# Patient Record
Sex: FEMALE | Race: ASIAN | NOT HISPANIC OR LATINO | Employment: FULL TIME | URBAN - METROPOLITAN AREA
[De-identification: names, ages, dates, MRNs, and addresses within clinical notes are randomized per-mention and may not be internally consistent; named-entity substitution may affect disease eponyms.]

---

## 2017-06-20 ENCOUNTER — APPOINTMENT (OUTPATIENT)
Dept: WOMENS IMAGING | Facility: HOSPITAL | Age: 42
End: 2017-06-20

## 2017-06-20 PROCEDURE — 77067 SCR MAMMO BI INCL CAD: CPT | Performed by: RADIOLOGY

## 2017-06-20 PROCEDURE — 77063 BREAST TOMOSYNTHESIS BI: CPT | Performed by: RADIOLOGY

## 2018-06-25 ENCOUNTER — APPOINTMENT (OUTPATIENT)
Dept: WOMENS IMAGING | Facility: HOSPITAL | Age: 43
End: 2018-06-25

## 2018-06-25 PROCEDURE — 77063 BREAST TOMOSYNTHESIS BI: CPT | Performed by: RADIOLOGY

## 2018-06-25 PROCEDURE — 77067 SCR MAMMO BI INCL CAD: CPT | Performed by: RADIOLOGY

## 2018-07-23 ENCOUNTER — LAB (OUTPATIENT)
Dept: LAB | Facility: HOSPITAL | Age: 43
End: 2018-07-23

## 2018-07-23 ENCOUNTER — OFFICE VISIT (OUTPATIENT)
Dept: GASTROENTEROLOGY | Facility: CLINIC | Age: 43
End: 2018-07-23

## 2018-07-23 VITALS
DIASTOLIC BLOOD PRESSURE: 94 MMHG | WEIGHT: 124.5 LBS | HEART RATE: 80 BPM | BODY MASS INDEX: 26.13 KG/M2 | SYSTOLIC BLOOD PRESSURE: 142 MMHG | HEIGHT: 58 IN

## 2018-07-23 DIAGNOSIS — K76.0 FATTY LIVER: Primary | ICD-10-CM

## 2018-07-23 DIAGNOSIS — R14.0 BLOATING: ICD-10-CM

## 2018-07-23 DIAGNOSIS — K76.0 FATTY LIVER: ICD-10-CM

## 2018-07-23 DIAGNOSIS — R19.7 DIARRHEA, UNSPECIFIED TYPE: ICD-10-CM

## 2018-07-23 DIAGNOSIS — L29.0 ANAL ITCHING: ICD-10-CM

## 2018-07-23 LAB
ALBUMIN SERPL-MCNC: 4.5 G/DL (ref 3.5–5.2)
ALP SERPL-CCNC: 40 U/L (ref 39–117)
ALT SERPL W P-5'-P-CCNC: 90 U/L (ref 1–33)
AST SERPL-CCNC: 68 U/L (ref 1–32)
BILIRUB CONJ SERPL-MCNC: <0.2 MG/DL (ref 0–0.3)
BILIRUB INDIRECT SERPL-MCNC: ABNORMAL MG/DL
BILIRUB SERPL-MCNC: 0.7 MG/DL (ref 0.1–1.2)
PROT SERPL-MCNC: 7.8 G/DL (ref 6–8.5)

## 2018-07-23 PROCEDURE — 83516 IMMUNOASSAY NONANTIBODY: CPT

## 2018-07-23 PROCEDURE — 84165 PROTEIN E-PHORESIS SERUM: CPT

## 2018-07-23 PROCEDURE — 84155 ASSAY OF PROTEIN SERUM: CPT

## 2018-07-23 PROCEDURE — 99204 OFFICE O/P NEW MOD 45 MIN: CPT | Performed by: INTERNAL MEDICINE

## 2018-07-23 PROCEDURE — 36415 COLL VENOUS BLD VENIPUNCTURE: CPT | Performed by: INTERNAL MEDICINE

## 2018-07-23 PROCEDURE — 80076 HEPATIC FUNCTION PANEL: CPT | Performed by: INTERNAL MEDICINE

## 2018-07-23 RX ORDER — CHOLESTYRAMINE 4 G/9G
4 POWDER, FOR SUSPENSION ORAL DAILY
Qty: 378 G | Refills: 3 | Status: SHIPPED | OUTPATIENT
Start: 2018-07-23 | End: 2018-10-04

## 2018-07-23 RX ORDER — HYDROCORTISONE ACETATE 25 MG
SUPPOSITORY, RECTAL RECTAL
Refills: 0 | COMMUNITY
Start: 2018-06-19 | End: 2019-11-29

## 2018-07-23 NOTE — PATIENT INSTRUCTIONS
Use probiotic of choice (examples include Florastor, Align, [employer], Activia, VSL#3, or any of the common generics).     Vitamin E 400 IU daily.

## 2018-07-23 NOTE — PROGRESS NOTES
Subjective   Dolores Alvarez is a 43 y.o.. female is being seen for consultation today at the request of No ref. provider found    Chief Complaint   Patient presents with   • Elevated Hepatic Enzymes   • Diarrhea   • Bloated   • Gas   • Anal Itching     History of Present Illness  3 issues:    #1 patient was recently noted to have mildly abnormal liver chemistries.  Additional evaluation included hepatic ultrasound which was consistent with steatosis.  Hepatitis serologies were negative.  Metabolic studies were negative but the patient had a positive TOMÁS, significance of which is unknown.  Patient has a small stature and at the weight of 138 pounds had what appeared to be a great deal of truncal obesity.  She's been able lose some weight but she notes that the truncal obesity is no better.  She also had panel demonstrated a HDL in the 60s, and triglycerides in the neighborhood of 140.    #2 chronic diarrhea, get somewhat worse with meals.  Started when she had her gallbladder removed.  Previous colonoscopies 2010 and /2012 negative.  Rectal bleeding.  No unplanned weight loss.    #3 pruritus ani. Recently prescribed Anusol HC suppositories but has not started them yet.    The following portions of the patient's history were reviewed and updated as appropriate: allergies, current medications, past family history, past medical history, past social history, past surgical history and problem list.      Current Outpatient Prescriptions:   •  ANUCORT-HC 25 MG suppository, , Disp: , Rfl: 0  •  Calcium-Vitamin D-Vitamin K (VIACTIV PO), Take  by mouth., Disp: , Rfl:   •  Multiple Vitamins-Minerals (MULTIVITAMIN ADULT PO), Take  by mouth., Disp: , Rfl:   •  Norethin Ace-Eth Estrad-FE (MICROGESTIN 24 FE PO), Take  by mouth., Disp: , Rfl:   •  cholestyramine (QUESTRAN) 4 GM/DOSE powder, Take 1 packet by mouth Daily. mixed with a liquid, Disp: 378 g, Rfl: 3    Family History   Problem Relation Age of Onset   • Adopted: Yes    • Family history unknown: Yes       Review of Systems   Constitutional: Negative for appetite change, diaphoresis, fatigue, fever and unexpected weight change.   HENT: Negative for hearing loss, mouth sores, sore throat and trouble swallowing.    Eyes: Negative for pain and redness.   Respiratory: Negative for choking and shortness of breath.    Cardiovascular: Negative for chest pain and leg swelling.   Gastrointestinal: Positive for abdominal distention and diarrhea. Negative for abdominal pain, anal bleeding, blood in stool, constipation, nausea, rectal pain and vomiting.   Genitourinary: Negative for flank pain and hematuria.   Musculoskeletal: Negative for arthralgias and joint swelling.   Skin: Negative for color change and rash.   Allergic/Immunologic: Negative for food allergies and immunocompromised state.   Neurological: Negative for dizziness, seizures and headaches.   Hematological: Does not bruise/bleed easily.   Psychiatric/Behavioral: Negative for confusion, sleep disturbance and suicidal ideas. The patient is not nervous/anxious.        Objective   Physical Exam   Constitutional: She is oriented to person, place, and time. She appears well-developed and well-nourished.   HENT:   Head: Normocephalic and atraumatic.   Right Ear: External ear normal.   Left Ear: External ear normal.   Nose: Nose normal.   Eyes: Pupils are equal, round, and reactive to light. Conjunctivae are normal.   Neck: Neck supple. No thyromegaly present.   Cardiovascular: Normal heart sounds.  Exam reveals no gallop and no friction rub.    No murmur heard.  Pulmonary/Chest: Effort normal and breath sounds normal.   Abdominal: Soft. Bowel sounds are normal. She exhibits no distension and no mass. There is no tenderness.   Musculoskeletal: She exhibits no edema.   Neurological: She is alert and oriented to person, place, and time.   Skin: No rash noted. No erythema.   Psychiatric: She has a normal mood and affect. Her behavior is  "normal.   Nursing note and vitals reviewed.    Rectal examination was normal.  Hemoccult-negative stool.  Pertinent laboratory results were reviewed. , Pertinent old records were reviewed. , Pertinent radiology results were reviewed.  and A summary of records review may be found in my HPI.     Assessment/Plan   Problems Addressed this Visit        Digestive    Fatty liver - Primary    Relevant Orders    Anti-Smooth Muscle Antibody Titer    Protein Electrophoresis, Total    Hepatic Function Panel    Diarrhea    Relevant Orders    Anti-Smooth Muscle Antibody Titer    Protein Electrophoresis, Total    Hepatic Function Panel       Musculoskeletal and Integument    Anal itching    Relevant Orders    Anti-Smooth Muscle Antibody Titer    Protein Electrophoresis, Total    Hepatic Function Panel       Other    Bloating    Relevant Orders    Anti-Smooth Muscle Antibody Titer    Protein Electrophoresis, Total    Hepatic Function Panel        I suspect the TOMÁS is not relevant to her presentation and that she has steatosis.  I think her truncal obesity is both genetic and diet related.  I would like to go ahead and get additional autoimmune markers and monitor her hepatic functions.  I reviewed with her the book \"[facility] [facility]\" by Dr Nesha Wilburn strongly encouraged her to obtain the book and read especially the first half which discusses dietary measures.  I'm recommending that she try small dose Questran and probiotic therapy.  Vitamin E therapy recommended.  See her back in about 6 weeks.  "

## 2018-07-24 LAB
ACTIN IGG SERPL-ACNC: 10 UNITS (ref 0–19)
ALBUMIN SERPL-MCNC: 3.9 G/DL (ref 2.9–4.4)
ALBUMIN/GLOB SERPL: 1.1 {RATIO} (ref 0.7–1.7)
ALPHA1 GLOB FLD ELPH-MCNC: 0.3 G/DL (ref 0–0.4)
ALPHA2 GLOB SERPL ELPH-MCNC: 0.7 G/DL (ref 0.4–1)
B-GLOBULIN SERPL ELPH-MCNC: 1.3 G/DL (ref 0.7–1.3)
GAMMA GLOB SERPL ELPH-MCNC: 1.3 G/DL (ref 0.4–1.8)
GLOBULIN SER CALC-MCNC: 3.6 G/DL (ref 2.2–3.9)
Lab: NORMAL
M-SPIKE: NORMAL G/DL
PROT SERPL-MCNC: 7.5 G/DL (ref 6–8.5)

## 2018-07-26 ENCOUNTER — TELEPHONE (OUTPATIENT)
Dept: GASTROENTEROLOGY | Facility: CLINIC | Age: 43
End: 2018-07-26

## 2018-08-01 ENCOUNTER — TELEPHONE (OUTPATIENT)
Dept: GASTROENTEROLOGY | Facility: CLINIC | Age: 43
End: 2018-08-01

## 2018-08-01 NOTE — TELEPHONE ENCOUNTER
----- Message from Gaudencio Martinez MD sent at 8/1/2018 12:57 PM EDT -----  Please call the patient regarding her abnormal result. Need to consider low-carb diet.

## 2018-08-01 NOTE — TELEPHONE ENCOUNTER
Pt notified of lab results (hepatic function panel), liver enzymes still elevated.  Dr Martinez recommends high fat-low carb diet as reviewed at last office visit (Whole 30 diet, Joy, or [place]).  Pt has FU appt 9/4/18.

## 2018-08-06 ENCOUNTER — OFFICE (OUTPATIENT)
Dept: URBAN - METROPOLITAN AREA CLINIC 75 | Facility: CLINIC | Age: 43
End: 2018-08-06

## 2018-08-06 VITALS
HEART RATE: 91 BPM | WEIGHT: 131 LBS | HEIGHT: 58 IN | DIASTOLIC BLOOD PRESSURE: 88 MMHG | SYSTOLIC BLOOD PRESSURE: 120 MMHG

## 2018-08-06 DIAGNOSIS — R19.7 DIARRHEA, UNSPECIFIED: ICD-10-CM

## 2018-08-06 DIAGNOSIS — R94.5 ABNORMAL RESULTS OF LIVER FUNCTION STUDIES: ICD-10-CM

## 2018-08-06 PROCEDURE — 99243 OFF/OP CNSLTJ NEW/EST LOW 30: CPT | Performed by: INTERNAL MEDICINE

## 2018-10-04 ENCOUNTER — OFFICE VISIT (OUTPATIENT)
Dept: GASTROENTEROLOGY | Facility: CLINIC | Age: 43
End: 2018-10-04

## 2018-10-04 VITALS
SYSTOLIC BLOOD PRESSURE: 131 MMHG | BODY MASS INDEX: 26.24 KG/M2 | HEIGHT: 58 IN | HEART RATE: 85 BPM | DIASTOLIC BLOOD PRESSURE: 88 MMHG | WEIGHT: 125 LBS

## 2018-10-04 DIAGNOSIS — R14.0 BLOATING: ICD-10-CM

## 2018-10-04 DIAGNOSIS — K76.0 FATTY LIVER: Primary | ICD-10-CM

## 2018-10-04 PROCEDURE — 99213 OFFICE O/P EST LOW 20 MIN: CPT | Performed by: INTERNAL MEDICINE

## 2018-10-04 RX ORDER — DICYCLOMINE HYDROCHLORIDE 10 MG/1
10 CAPSULE ORAL
Qty: 90 CAPSULE | Refills: 2 | Status: SHIPPED | OUTPATIENT
Start: 2018-10-04 | End: 2019-11-29

## 2018-10-04 NOTE — PROGRESS NOTES
Subjective   Dolores Alvarez is a 43 y.o.. female is here today for follow-up.    Chief Complaint   Patient presents with   • Elevated Hepatic Enzymes   • Diarrhea   • Bloated   • Gas     History of Present Illness  Patient carries a diagnosis of fatty liver.  Dietary manipulation was advised, patient readily admits that she likes carbohydrates too much to give them up easily.  She continues to have abdominal bloating and flatulence, typically postprandially.    The following portions of the patient's history were reviewed and updated as appropriate: allergies, current medications, past family history, past medical history, past social history, past surgical history and problem list.      Current Outpatient Prescriptions:   •  ANUCORT-HC 25 MG suppository, , Disp: , Rfl: 0  •  Calcium-Vitamin D-Vitamin K (VIACTIV PO), Take  by mouth., Disp: , Rfl:   •  Multiple Vitamins-Minerals (MULTIVITAMIN ADULT PO), Take  by mouth., Disp: , Rfl:   •  Norethin Ace-Eth Estrad-FE (MICROGESTIN 24 FE PO), Take  by mouth., Disp: , Rfl:   •  dicyclomine (BENTYL) 10 MG capsule, Take 1 capsule by mouth 4 (Four) Times a Day Before Meals & at Bedtime., Disp: 90 capsule, Rfl: 2    Family History   Problem Relation Age of Onset   • Adopted: Yes   • Family history unknown: Yes       Review of Systems   Respiratory: Negative for shortness of breath.    Cardiovascular: Negative for chest pain.   All other systems reviewed and are negative.      Objective   Physical Exam   Constitutional: She appears well-developed and well-nourished.   Nursing note and vitals reviewed.      Pertinent laboratory results were reviewed.  and Pertinent old records were reviewed.     Assessment/Plan   Problems Addressed this Visit        Digestive    Fatty liver - Primary    Relevant Orders    Hepatic Function Panel       Other    Bloating        We will update her hepatic function panel.  I can simplify his with her carb addiction but I think she feel better  if she could reduce her carb intake.  We will give her a trial of dicyclomine 10 mg by mouth 3 times a day when necessary

## 2018-10-06 LAB
ALBUMIN SERPL-MCNC: 4.3 G/DL (ref 3.5–5.5)
ALP SERPL-CCNC: 41 IU/L (ref 39–117)
ALT SERPL-CCNC: 65 IU/L (ref 0–32)
AST SERPL-CCNC: 39 IU/L (ref 0–40)
BILIRUB DIRECT SERPL-MCNC: 0.14 MG/DL (ref 0–0.4)
BILIRUB SERPL-MCNC: 0.4 MG/DL (ref 0–1.2)
PROT SERPL-MCNC: 6.9 G/DL (ref 6–8.5)

## 2018-10-15 ENCOUNTER — TELEPHONE (OUTPATIENT)
Dept: GASTROENTEROLOGY | Facility: CLINIC | Age: 43
End: 2018-10-15

## 2018-10-15 NOTE — TELEPHONE ENCOUNTER
LVM with pt regarding lab results as reviewed by Dr Martinez: liver enzymes improving.  Pt has FU appt Jan 2019.

## 2018-10-15 NOTE — TELEPHONE ENCOUNTER
----- Message from Gaudencio Martinez MD sent at 10/12/2018  5:57 PM EDT -----  Please call the patient regarding her abnormal result.  Liver chemistries have improved.

## 2019-01-08 ENCOUNTER — OFFICE VISIT (OUTPATIENT)
Dept: GASTROENTEROLOGY | Facility: CLINIC | Age: 44
End: 2019-01-08

## 2019-01-08 VITALS
BODY MASS INDEX: 27.5 KG/M2 | WEIGHT: 131 LBS | HEART RATE: 93 BPM | SYSTOLIC BLOOD PRESSURE: 144 MMHG | DIASTOLIC BLOOD PRESSURE: 89 MMHG | HEIGHT: 58 IN

## 2019-01-08 DIAGNOSIS — K76.0 FATTY LIVER: ICD-10-CM

## 2019-01-08 DIAGNOSIS — R19.7 DIARRHEA, UNSPECIFIED TYPE: Primary | ICD-10-CM

## 2019-01-08 PROCEDURE — 99213 OFFICE O/P EST LOW 20 MIN: CPT | Performed by: INTERNAL MEDICINE

## 2019-01-08 RX ORDER — SODIUM CHLORIDE, SODIUM LACTATE, POTASSIUM CHLORIDE, CALCIUM CHLORIDE 600; 310; 30; 20 MG/100ML; MG/100ML; MG/100ML; MG/100ML
30 INJECTION, SOLUTION INTRAVENOUS CONTINUOUS
Status: CANCELLED | OUTPATIENT
Start: 2019-01-15

## 2019-01-08 RX ORDER — DIAZEPAM 5 MG/1
5 TABLET ORAL
COMMUNITY
Start: 2018-02-11 | End: 2019-11-29

## 2019-01-08 NOTE — H&P (VIEW-ONLY)
Subjective   Dolores Alvarez is a 43 y.o.. female is here today for follow-up.    Chief Complaint   Patient presents with   • Elevated Hepatic Enzymes     Fatty Liver   • Bloated   • Gas   • Diarrhea       History of Present Illness  Patient continues to have intermittent diarrhea and abdominal bloating.  She is made some effort at dietary management and this is not been helpful.  He is not had any significant weight change.    The following portions of the patient's history were reviewed and updated as appropriate: allergies, current medications, past family history, past medical history, past social history, past surgical history and problem list.      Current Outpatient Medications:   •  Calcium-Vitamin D-Vitamin K (VIACTIV PO), Take  by mouth., Disp: , Rfl:   •  diazePAM (VALIUM) 5 MG tablet, Take 5 mg by mouth., Disp: , Rfl:   •  Multiple Vitamins-Minerals (MULTIVITAMIN ADULT PO), Take  by mouth., Disp: , Rfl:   •  Norethin Ace-Eth Estrad-FE (MICROGESTIN 24 FE PO), Take  by mouth., Disp: , Rfl:   •  ANUCORT-HC 25 MG suppository, , Disp: , Rfl: 0  •  dicyclomine (BENTYL) 10 MG capsule, Take 1 capsule by mouth 4 (Four) Times a Day Before Meals & at Bedtime., Disp: 90 capsule, Rfl: 2    Family History   Adopted: Yes   Family history unknown: Yes       Review of Systems   Respiratory: Negative for shortness of breath.    Cardiovascular: Negative for chest pain.   All other systems reviewed and are negative.      Objective   Physical Exam   Constitutional: She appears well-developed and well-nourished.   Nursing note and vitals reviewed.      Pertinent laboratory results were reviewed.  and Pertinent old records were reviewed.     Assessment/Plan   Problems Addressed this Visit        Digestive    Fatty liver    Relevant Orders    Case Request (Completed)    Diarrhea - Primary    Relevant Orders    Case Request (Completed)        We have discussed previously that of her lower tract symptoms persisted we would  proceed with colonoscopy.  I think now that time has come to schedule that examination and she agrees.

## 2019-01-15 ENCOUNTER — HOSPITAL ENCOUNTER (OUTPATIENT)
Facility: HOSPITAL | Age: 44
Setting detail: HOSPITAL OUTPATIENT SURGERY
Discharge: HOME | End: 2019-01-15
Attending: INTERNAL MEDICINE | Admitting: INTERNAL MEDICINE

## 2019-01-15 ENCOUNTER — ANESTHESIA EVENT (OUTPATIENT)
Dept: GASTROENTEROLOGY | Facility: HOSPITAL | Age: 44
End: 2019-01-15

## 2019-01-15 ENCOUNTER — ANESTHESIA (OUTPATIENT)
Dept: GASTROENTEROLOGY | Facility: HOSPITAL | Age: 44
End: 2019-01-15

## 2019-01-15 VITALS
HEIGHT: 58 IN | HEART RATE: 66 BPM | WEIGHT: 134 LBS | RESPIRATION RATE: 12 BRPM | SYSTOLIC BLOOD PRESSURE: 124 MMHG | TEMPERATURE: 97.7 F | DIASTOLIC BLOOD PRESSURE: 79 MMHG | OXYGEN SATURATION: 99 % | BODY MASS INDEX: 28.13 KG/M2

## 2019-01-15 DIAGNOSIS — R19.7 DIARRHEA, UNSPECIFIED TYPE: ICD-10-CM

## 2019-01-15 DIAGNOSIS — K76.0 FATTY LIVER: ICD-10-CM

## 2019-01-15 LAB
B-HCG UR QL: NEGATIVE
INTERNAL NEGATIVE CONTROL: NEGATIVE
INTERNAL POSITIVE CONTROL: POSITIVE
Lab: NORMAL

## 2019-01-15 PROCEDURE — 25010000002 PROPOFOL 10 MG/ML EMULSION: Performed by: ANESTHESIOLOGY

## 2019-01-15 PROCEDURE — 25010000002 MIDAZOLAM PER 1 MG: Performed by: ANESTHESIOLOGY

## 2019-01-15 PROCEDURE — 45380 COLONOSCOPY AND BIOPSY: CPT | Performed by: INTERNAL MEDICINE

## 2019-01-15 PROCEDURE — 88305 TISSUE EXAM BY PATHOLOGIST: CPT | Performed by: INTERNAL MEDICINE

## 2019-01-15 PROCEDURE — 81025 URINE PREGNANCY TEST: CPT | Performed by: INTERNAL MEDICINE

## 2019-01-15 RX ORDER — PROPOFOL 10 MG/ML
VIAL (ML) INTRAVENOUS CONTINUOUS PRN
Status: DISCONTINUED | OUTPATIENT
Start: 2019-01-15 | End: 2019-01-15 | Stop reason: SURG

## 2019-01-15 RX ORDER — MIDAZOLAM HYDROCHLORIDE 1 MG/ML
INJECTION INTRAMUSCULAR; INTRAVENOUS AS NEEDED
Status: DISCONTINUED | OUTPATIENT
Start: 2019-01-15 | End: 2019-01-15 | Stop reason: SURG

## 2019-01-15 RX ORDER — SODIUM CHLORIDE, SODIUM LACTATE, POTASSIUM CHLORIDE, CALCIUM CHLORIDE 600; 310; 30; 20 MG/100ML; MG/100ML; MG/100ML; MG/100ML
30 INJECTION, SOLUTION INTRAVENOUS CONTINUOUS
Status: DISCONTINUED | OUTPATIENT
Start: 2019-01-15 | End: 2019-01-15 | Stop reason: HOSPADM

## 2019-01-15 RX ORDER — PROPOFOL 10 MG/ML
VIAL (ML) INTRAVENOUS AS NEEDED
Status: DISCONTINUED | OUTPATIENT
Start: 2019-01-15 | End: 2019-01-15 | Stop reason: SURG

## 2019-01-15 RX ADMIN — PROPOFOL 100 MCG/KG/MIN: 10 INJECTION, EMULSION INTRAVENOUS at 08:48

## 2019-01-15 RX ADMIN — PROPOFOL 100 MG: 10 INJECTION, EMULSION INTRAVENOUS at 08:49

## 2019-01-15 RX ADMIN — SODIUM CHLORIDE, POTASSIUM CHLORIDE, SODIUM LACTATE AND CALCIUM CHLORIDE 30 ML/HR: 600; 310; 30; 20 INJECTION, SOLUTION INTRAVENOUS at 08:13

## 2019-01-15 RX ADMIN — MIDAZOLAM 1 MG: 1 INJECTION INTRAMUSCULAR; INTRAVENOUS at 08:48

## 2019-01-15 NOTE — ANESTHESIA POSTPROCEDURE EVALUATION
"Patient: Dolores Alvarez    Procedure Summary     Date:  01/15/19 Room / Location:   [facility]    Anesthesia Start:  0843 Anesthesia Stop:  0909    Procedure:  COLONOSCOPY TO TERMINAL ILEUM WITH BX (N/A ) Diagnosis:       Diarrhea, unspecified type      Fatty liver      (Diarrhea, unspecified type [R19.7])      (Fatty liver [K76.0])    Surgeon:  Gaudencio Martinez MD Provider:  Peter Guerrero MD    Anesthesia Type:  MAC ASA Status:  2          Anesthesia Type: MAC  Last vitals  BP   98/57 (01/15/19 0909)   Temp   36.5 °C (97.7 °F) (01/15/19 0909)   Pulse   86 (01/15/19 0909)   Resp   12 (01/15/19 0909)     SpO2   100 % (01/15/19 0909)     Post Anesthesia Care and Evaluation    Patient location during evaluation: bedside  Patient participation: complete - patient participated  Level of consciousness: awake and alert  Pain management: adequate  Airway patency: patent  Anesthetic complications: No anesthetic complications    Cardiovascular status: acceptable  Respiratory status: acceptable  Hydration status: acceptable    Comments: BP 98/57   Pulse 86   Temp 36.5 °C (97.7 °F) (Oral)   Resp 12   Ht 147.3 cm (58\")   Wt 60.8 kg (134 lb)   LMP  (Within Years) Comment: 15 YEARS  SpO2 100%   Breastfeeding? No   BMI 28.01 kg/m²       "

## 2019-01-15 NOTE — ANESTHESIA PREPROCEDURE EVALUATION
Anesthesia Evaluation     Patient summary reviewed and Nursing notes reviewed   no history of anesthetic complications:  NPO Solid Status: > 8 hours  NPO Liquid Status: > 8 hours           Airway   Mallampati: II  Dental      Pulmonary - negative pulmonary ROS and normal exam   Cardiovascular - normal exam        Neuro/Psych- negative ROS  GI/Hepatic/Renal/Endo    (+)   liver disease fatty liver disease,     Musculoskeletal     Abdominal    Substance History      OB/GYN          Other                        Anesthesia Plan    ASA 2     MAC     intravenous induction   Anesthetic plan, all risks, benefits, and alternatives have been provided, discussed and informed consent has been obtained with: patient.

## 2019-01-16 LAB
CYTO UR: NORMAL
LAB AP CASE REPORT: NORMAL
PATH REPORT.FINAL DX SPEC: NORMAL
PATH REPORT.GROSS SPEC: NORMAL

## 2019-01-30 ENCOUNTER — TELEPHONE (OUTPATIENT)
Dept: GASTROENTEROLOGY | Facility: CLINIC | Age: 44
End: 2019-01-30

## 2019-01-30 NOTE — TELEPHONE ENCOUNTER
There was not a recall date listed on colonoscopy report.  Discussed with Dr Martinez:  Recall date 10 years.    LVM with pt regarding above, and also to call office to sched f/u appt if still having issues.

## 2019-06-28 ENCOUNTER — TELEPHONE (OUTPATIENT)
Dept: INTERNAL MEDICINE | Facility: CLINIC | Age: 44
End: 2019-06-28

## 2019-06-28 NOTE — TELEPHONE ENCOUNTER
Pt informed. She is going to call the previous office to speak to them. I provided her with their number.   Immunizations have been recorded into her chart.

## 2019-06-28 NOTE — TELEPHONE ENCOUNTER
Pt called today regarding Hepatitis B vaccinations.   She was a previous pt of yours.   I called the previous office to figure out when she had these done.   1st vaccination: 09/12/2018.  2nd vaccination: 02/27/2019.  Pt is wanting to know if she has to start over with these?  She was getting upset regarding these vaccinations.

## 2019-06-28 NOTE — TELEPHONE ENCOUNTER
So, would she need to start over since the first 2 shots are 5 months apart?  I just want to make sure to have full clarification for pt.

## 2021-08-13 ENCOUNTER — APPOINTMENT (OUTPATIENT)
Dept: WOMENS IMAGING | Facility: HOSPITAL | Age: 46
End: 2021-08-13

## 2021-08-13 PROCEDURE — 77067 SCR MAMMO BI INCL CAD: CPT | Performed by: RADIOLOGY

## 2021-08-13 PROCEDURE — 77063 BREAST TOMOSYNTHESIS BI: CPT | Performed by: RADIOLOGY

## 2021-10-13 NOTE — TELEPHONE ENCOUNTER
----- Message from Gaudencio Martinez MD sent at 7/25/2018  5:51 PM EDT -----  Advise patient the results were normal.  
LVM with pt regarding normal labs (protein electrophoresis, anti-smooth AB) as reviewed by Dr Martinez.  
For information on Fall & injury Prevention, visit [email or web address]

## (undated) DEVICE — CANN NASL CO2 TRULINK W/O2 A/

## (undated) DEVICE — Device: Brand: DEFENDO AIR/WATER/SUCTION AND BIOPSY VALVE

## (undated) DEVICE — THE TORRENT IRRIGATION SCOPE CONNECTOR IS USED WITH THE TORRENT IRRIGATION TUBING TO PROVIDE IRRIGATION FLUIDS SUCH AS STERILE WATER DURING GASTROINTESTINAL ENDOSCOPIC PROCEDURES WHEN USED IN CONJUNCTION WITH AN IRRIGATION PUMP (OR ELECTROSURGICAL UNIT).: Brand: TORRENT

## (undated) DEVICE — TUBING, SUCTION, 1/4" X 10', STRAIGHT: Brand: MEDLINE

## (undated) DEVICE — FRCP BX RADJAW4 NDL 2.8 240CM LG OG BX40